# Patient Record
Sex: FEMALE | Race: WHITE | NOT HISPANIC OR LATINO | Employment: UNEMPLOYED | URBAN - NONMETROPOLITAN AREA
[De-identification: names, ages, dates, MRNs, and addresses within clinical notes are randomized per-mention and may not be internally consistent; named-entity substitution may affect disease eponyms.]

---

## 2024-10-21 ENCOUNTER — OFFICE VISIT (OUTPATIENT)
Dept: ORTHOPEDIC SURGERY | Facility: CLINIC | Age: 29
End: 2024-10-21
Payer: COMMERCIAL

## 2024-10-21 VITALS
DIASTOLIC BLOOD PRESSURE: 74 MMHG | HEIGHT: 66 IN | HEART RATE: 79 BPM | WEIGHT: 229.28 LBS | SYSTOLIC BLOOD PRESSURE: 145 MMHG | BODY MASS INDEX: 36.85 KG/M2

## 2024-10-21 DIAGNOSIS — Z01.818 PREOPERATIVE TESTING: ICD-10-CM

## 2024-10-21 DIAGNOSIS — G56.01 CARPAL TUNNEL SYNDROME, RIGHT: Primary | ICD-10-CM

## 2024-10-21 RX ORDER — LORATADINE 10 MG/1
TABLET ORAL
COMMUNITY
Start: 2024-10-10

## 2024-10-21 RX ORDER — ERGOCALCIFEROL 1.25 MG/1
CAPSULE, LIQUID FILLED ORAL
COMMUNITY
Start: 2024-10-10

## 2024-10-21 RX ORDER — GABAPENTIN 800 MG/1
TABLET ORAL
COMMUNITY
Start: 2024-10-10

## 2024-10-21 RX ORDER — ALBUTEROL SULFATE 90 UG/1
AEROSOL, METERED RESPIRATORY (INHALATION)
COMMUNITY
Start: 2024-10-10

## 2024-10-21 RX ORDER — HYDROCHLOROTHIAZIDE 12.5 MG/1
TABLET ORAL
COMMUNITY
Start: 2024-10-10

## 2024-10-21 RX ORDER — FLUOXETINE 10 MG/1
CAPSULE ORAL
COMMUNITY
Start: 2024-10-10

## 2024-10-21 NOTE — PROGRESS NOTES
New Patient Visit      Patient: Pam ZUNIGA  YOB: 1995  Date of Encounter: 10/21/2024        Chief Complaint:   Chief Complaint   Patient presents with    Left Hand - Pain, Numbness, Initial Evaluation    Right Hand - Pain, Numbness, Initial Evaluation           HPI:   Pam ZUNIGA, 29 y.o. female, referred by Tl Arechiga APRN presents        Active Problem List:  Patient Active Problem List   Diagnosis    Pregnancy    Postpartum care following vaginal delivery    Low hemoglobin           Past Medical History:  Past Medical History:   Diagnosis Date    Asthma            Past Surgical History:  Past Surgical History:   Procedure Laterality Date    MOUTH SURGERY             Family History:  Family History   Problem Relation Age of Onset    Alcohol abuse Father     Coronary artery disease Father     Depression Father     Hepatitis Father     Hypertension Father     Hepatitis Mother     Alcohol abuse Paternal Grandfather     Coronary artery disease Paternal Grandfather     Liver disease Paternal Grandfather     Kidney disease Paternal Grandfather     Melanoma Paternal Grandmother     Lupus Paternal Grandmother     Coronary artery disease Paternal Grandmother     Asthma Maternal Grandmother     Coronary artery disease Maternal Grandmother     Diabetes Maternal Grandmother     Kidney disease Maternal Grandmother     Stroke Maternal Grandmother          Social History:  Social History     Socioeconomic History    Marital status: Single   Tobacco Use    Smoking status: Every Day     Types: Cigarettes    Smokeless tobacco: Never   Vaping Use    Vaping status: Some Days   Substance and Sexual Activity    Alcohol use: No    Drug use: No    Sexual activity: Yes     Partners: Male     Birth control/protection: None     Body mass index is 37.02 kg/m².      Medications:  Current Outpatient Medications   Medication Sig Dispense Refill    ferrous sulfate 325 (65 FE) MG tablet Take 1 tablet by mouth 2 (Two)  "Times a Day Before Meals. 60 tablet 1    FLUoxetine (PROzac) 10 MG capsule       gabapentin (NEURONTIN) 800 MG tablet       hydroCHLOROthiazide 12.5 MG tablet       ibuprofen (ADVIL,MOTRIN) 600 MG tablet Take 1 tablet by mouth Every 6 (Six) Hours As Needed for Mild Pain . 40 tablet 1    loratadine (CLARITIN) 10 MG tablet       Ventolin  (90 Base) MCG/ACT inhaler       vitamin D (ERGOCALCIFEROL) 1.25 MG (80167 UT) capsule capsule       docusate sodium (COLACE) 100 MG capsule Take 1 capsule by mouth Daily. 40 capsule 1    Prenatal Vit-Fe Fumarate-FA (PRENATAL, CLASSIC, VITAMIN) 28-0.8 MG tablet tablet Take 1 tablet by mouth Daily.       No current facility-administered medications for this visit.         Allergies:  No Known Allergies      Review of Systems:   Review of Systems      Physical Exam:   Physical Exam  GENERAL: 29 y.o. female, alert and oriented X 3 in no acute distress.   Visit Vitals  /74   Pulse 79   Ht 167.6 cm (65.98\")   Wt 104 kg (229 lb 4.5 oz)   BMI 37.02 kg/m²       GENERAL APPEARANCE: Awake, alert & oriented, in no acute distress and well developed, well nourished.   PSYCH: Normal mood and affect  LUNGS: Breathing nonlabored, no wheezing  EYES: Sclera anicteric, pupils equal  CARDIOVASCULAR: Palpable pulses. Capillary refill less than 2 seconds  INTEGUMENTARY: Skin intact, co clubbing, cyanosis  NEUROLOGIC: Normal Sensation  MUSCULOSKELETAL:  Orthopedic Examination:          Radiology/Labs:     No radiology results for the last 30 days.        Radiographs          Assessment & Plan:   29 y.o. female presents      No diagnosis found.      Procedures      Cc:   Tl Arechiga, APRN                This document has been electronically signed by Jaiden Dumont MD   October 21, 2024 10:07 EDT      "

## 2024-10-21 NOTE — PROGRESS NOTES
History and Physical      Patient: Pam ZUNIGA  YOB: 1995  Date of Encounter: 10/21/2024      Chief Complaint:   Chief Complaint   Patient presents with    Left Hand - Pain, Numbness, Initial Evaluation    Right Hand - Pain, Numbness, Initial Evaluation           HPI:   Pam ZUNIGA, 29 y.o. female, presents for ration of bilateral hand pain numbness right greater than left she has 2-year history of the above complaints she describes numbness 1st through 4th fingers right hand and first and second fingers left hand she describes pain at night which awakens her.  She has worn night braces which have benefited some but her symptoms are progressing she now notices numbness and pain with driving a vehicle or using her phone.  She presents today having nerve conduction studies completed.  Her past medical history includes asthma negative for diabetes or thyroid disease she has 3 young children at home.        Active Problem List:  Patient Active Problem List   Diagnosis    Pregnancy    Postpartum care following vaginal delivery    Low hemoglobin           Past Medical History:  Past Medical History:   Diagnosis Date    Asthma            Past Surgical History:  Past Surgical History:   Procedure Laterality Date    MOUTH SURGERY             Family History:  Family History   Problem Relation Age of Onset    Alcohol abuse Father     Coronary artery disease Father     Depression Father     Hepatitis Father     Hypertension Father     Hepatitis Mother     Alcohol abuse Paternal Grandfather     Coronary artery disease Paternal Grandfather     Liver disease Paternal Grandfather     Kidney disease Paternal Grandfather     Melanoma Paternal Grandmother     Lupus Paternal Grandmother     Coronary artery disease Paternal Grandmother     Asthma Maternal Grandmother     Coronary artery disease Maternal Grandmother     Diabetes Maternal Grandmother     Kidney disease Maternal Grandmother     Stroke Maternal  Grandmother            Social History:  Social History     Socioeconomic History    Marital status: Single   Tobacco Use    Smoking status: Every Day     Types: Cigarettes    Smokeless tobacco: Never   Vaping Use    Vaping status: Some Days   Substance and Sexual Activity    Alcohol use: No    Drug use: No    Sexual activity: Yes     Partners: Male     Birth control/protection: None     Body mass index is 37.02 kg/m².  Class 2 Severe Obesity (BMI >=35 and <=39.9). Obesity-related health conditions include the following:  unknown . Obesity is  unknown . BMI is is above average; no BMI management plan is appropriate. We discussed portion control and increasing exercise.        Medications:  Current Outpatient Medications   Medication Sig Dispense Refill    ferrous sulfate 325 (65 FE) MG tablet Take 1 tablet by mouth 2 (Two) Times a Day Before Meals. 60 tablet 1    FLUoxetine (PROzac) 10 MG capsule       gabapentin (NEURONTIN) 800 MG tablet       hydroCHLOROthiazide 12.5 MG tablet       ibuprofen (ADVIL,MOTRIN) 600 MG tablet Take 1 tablet by mouth Every 6 (Six) Hours As Needed for Mild Pain . 40 tablet 1    loratadine (CLARITIN) 10 MG tablet       Ventolin  (90 Base) MCG/ACT inhaler       vitamin D (ERGOCALCIFEROL) 1.25 MG (85233 UT) capsule capsule       docusate sodium (COLACE) 100 MG capsule Take 1 capsule by mouth Daily. 40 capsule 1    Prenatal Vit-Fe Fumarate-FA (PRENATAL, CLASSIC, VITAMIN) 28-0.8 MG tablet tablet Take 1 tablet by mouth Daily.       No current facility-administered medications for this visit.           Allergies:  No Known Allergies        Review of Systems:   Review of Systems   Constitutional: Negative.  Negative for chills, fatigue and fever.   HENT: Negative.  Negative for congestion, ear pain, facial swelling, sore throat, trouble swallowing and voice change.    Eyes:  Negative for pain, discharge, redness and visual disturbance.   Respiratory: Negative.  Negative for apnea, cough,  "choking, chest tightness, shortness of breath, wheezing and stridor.    Cardiovascular: Negative.  Negative for chest pain, palpitations and leg swelling.   Gastrointestinal: Negative.  Negative for abdominal distention, abdominal pain, blood in stool, nausea and vomiting.   Endocrine: Negative.  Negative for cold intolerance, heat intolerance, polydipsia and polyphagia.   Genitourinary: Negative.  Negative for difficulty urinating, dysuria, flank pain, frequency and hematuria.   Musculoskeletal:  Positive for arthralgias.   Skin: Negative.  Negative for color change, pallor, rash and wound.   Allergic/Immunologic: Negative.  Negative for environmental allergies, food allergies and immunocompromised state.   Neurological: Negative.  Negative for dizziness, tremors, seizures, syncope, speech difficulty, weakness, light-headedness, numbness and headaches.   Hematological: Negative.  Negative for adenopathy. Does not bruise/bleed easily.   Psychiatric/Behavioral: Negative.  Negative for behavioral problems, confusion, dysphoric mood, self-injury, sleep disturbance and suicidal ideas. The patient is not nervous/anxious.            Physical Exam:   Physical Exam  GENERAL: 29 y.o. female, alert and oriented X 3 in no acute distress.   Visit Vitals  /74   Pulse 79   Ht 167.6 cm (65.98\")   Wt 104 kg (229 lb 4.5 oz)   Breastfeeding No   BMI 37.02 kg/m²         Musculoskeletal Examination:   Bilateral hands reveals normal thenar tone.  She has diminished sensation greater on the right than the left with involvement of thumb through fourth fingers.  She demonstrates moderately positive Phalen sign right greater than left vascular examination is intact.        Assessment & Plan:   29 y.o. female presents with 2-year history bilateral hand pain right greater than left with clinical findings of carpal tunnel syndrome supported by nerve conduction studies which describe moderate to severe carpal tunnel syndrome right.  " Discussed options she wishes to proceed with proposed carpal tunnel release right hand she is scheduled [facility] November 7, 2024 she will hold her ibuprofen preoperatively.      ICD-10-CM ICD-9-CM   1. Carpal tunnel syndrome, right  G56.01 354.0   2. Preoperative testing  Z01.818 V72.84           Cc:   Tl Arechiga, APRN              This document has been electronically signed by Jaiden Dumont MD   October 21, 2024 12:00 EDT

## 2024-10-21 NOTE — H&P (VIEW-ONLY)
History and Physical      Patient: Pam ZUNIGA  YOB: 1995  Date of Encounter: 10/21/2024      Chief Complaint:   Chief Complaint   Patient presents with    Left Hand - Pain, Numbness, Initial Evaluation    Right Hand - Pain, Numbness, Initial Evaluation           HPI:   Pam ZUNIGA, 29 y.o. female, presents for ration of bilateral hand pain numbness right greater than left she has 2-year history of the above complaints she describes numbness 1st through 4th fingers right hand and first and second fingers left hand she describes pain at night which awakens her.  She has worn night braces which have benefited some but her symptoms are progressing she now notices numbness and pain with driving a vehicle or using her phone.  She presents today having nerve conduction studies completed.  Her past medical history includes asthma negative for diabetes or thyroid disease she has 3 young children at home.        Active Problem List:  Patient Active Problem List   Diagnosis    Pregnancy    Postpartum care following vaginal delivery    Low hemoglobin           Past Medical History:  Past Medical History:   Diagnosis Date    Asthma            Past Surgical History:  Past Surgical History:   Procedure Laterality Date    MOUTH SURGERY             Family History:  Family History   Problem Relation Age of Onset    Alcohol abuse Father     Coronary artery disease Father     Depression Father     Hepatitis Father     Hypertension Father     Hepatitis Mother     Alcohol abuse Paternal Grandfather     Coronary artery disease Paternal Grandfather     Liver disease Paternal Grandfather     Kidney disease Paternal Grandfather     Melanoma Paternal Grandmother     Lupus Paternal Grandmother     Coronary artery disease Paternal Grandmother     Asthma Maternal Grandmother     Coronary artery disease Maternal Grandmother     Diabetes Maternal Grandmother     Kidney disease Maternal Grandmother     Stroke Maternal  Grandmother            Social History:  Social History     Socioeconomic History    Marital status: Single   Tobacco Use    Smoking status: Every Day     Types: Cigarettes    Smokeless tobacco: Never   Vaping Use    Vaping status: Some Days   Substance and Sexual Activity    Alcohol use: No    Drug use: No    Sexual activity: Yes     Partners: Male     Birth control/protection: None     Body mass index is 37.02 kg/m².  Class 2 Severe Obesity (BMI >=35 and <=39.9). Obesity-related health conditions include the following:  unknown . Obesity is  unknown . BMI is is above average; no BMI management plan is appropriate. We discussed portion control and increasing exercise.        Medications:  Current Outpatient Medications   Medication Sig Dispense Refill    ferrous sulfate 325 (65 FE) MG tablet Take 1 tablet by mouth 2 (Two) Times a Day Before Meals. 60 tablet 1    FLUoxetine (PROzac) 10 MG capsule       gabapentin (NEURONTIN) 800 MG tablet       hydroCHLOROthiazide 12.5 MG tablet       ibuprofen (ADVIL,MOTRIN) 600 MG tablet Take 1 tablet by mouth Every 6 (Six) Hours As Needed for Mild Pain . 40 tablet 1    loratadine (CLARITIN) 10 MG tablet       Ventolin  (90 Base) MCG/ACT inhaler       vitamin D (ERGOCALCIFEROL) 1.25 MG (36760 UT) capsule capsule       docusate sodium (COLACE) 100 MG capsule Take 1 capsule by mouth Daily. 40 capsule 1    Prenatal Vit-Fe Fumarate-FA (PRENATAL, CLASSIC, VITAMIN) 28-0.8 MG tablet tablet Take 1 tablet by mouth Daily.       No current facility-administered medications for this visit.           Allergies:  No Known Allergies        Review of Systems:   Review of Systems   Constitutional: Negative.  Negative for chills, fatigue and fever.   HENT: Negative.  Negative for congestion, ear pain, facial swelling, sore throat, trouble swallowing and voice change.    Eyes:  Negative for pain, discharge, redness and visual disturbance.   Respiratory: Negative.  Negative for apnea, cough,  "choking, chest tightness, shortness of breath, wheezing and stridor.    Cardiovascular: Negative.  Negative for chest pain, palpitations and leg swelling.   Gastrointestinal: Negative.  Negative for abdominal distention, abdominal pain, blood in stool, nausea and vomiting.   Endocrine: Negative.  Negative for cold intolerance, heat intolerance, polydipsia and polyphagia.   Genitourinary: Negative.  Negative for difficulty urinating, dysuria, flank pain, frequency and hematuria.   Musculoskeletal:  Positive for arthralgias.   Skin: Negative.  Negative for color change, pallor, rash and wound.   Allergic/Immunologic: Negative.  Negative for environmental allergies, food allergies and immunocompromised state.   Neurological: Negative.  Negative for dizziness, tremors, seizures, syncope, speech difficulty, weakness, light-headedness, numbness and headaches.   Hematological: Negative.  Negative for adenopathy. Does not bruise/bleed easily.   Psychiatric/Behavioral: Negative.  Negative for behavioral problems, confusion, dysphoric mood, self-injury, sleep disturbance and suicidal ideas. The patient is not nervous/anxious.            Physical Exam:   Physical Exam  GENERAL: 29 y.o. female, alert and oriented X 3 in no acute distress.   Visit Vitals  /74   Pulse 79   Ht 167.6 cm (65.98\")   Wt 104 kg (229 lb 4.5 oz)   Breastfeeding No   BMI 37.02 kg/m²         Musculoskeletal Examination:   Bilateral hands reveals normal thenar tone.  She has diminished sensation greater on the right than the left with involvement of thumb through fourth fingers.  She demonstrates moderately positive Phalen sign right greater than left vascular examination is intact.        Assessment & Plan:   29 y.o. female presents with 2-year history bilateral hand pain right greater than left with clinical findings of carpal tunnel syndrome supported by nerve conduction studies which describe moderate to severe carpal tunnel syndrome right.  " Discussed options she wishes to proceed with proposed carpal tunnel release right hand she is scheduled [facility] November 7, 2024 she will hold her ibuprofen preoperatively.      ICD-10-CM ICD-9-CM   1. Carpal tunnel syndrome, right  G56.01 354.0   2. Preoperative testing  Z01.818 V72.84           Cc:   Tl Arechiga, APRN              This document has been electronically signed by Jaiden Dumont MD   October 21, 2024 12:00 EDT

## 2024-11-04 NOTE — DISCHARGE INSTRUCTIONS
11/07/24  ARRIVAL TIME PER DR OFFICE    TAKE the following medications the morning of surgery:  All heart or blood pressure medications    HOLD all diabetic medications the morning of surgery as ordered by physician.    Please discontinue all blood thinners and anticoagulants (except aspirin) prior to surgery as per your surgeon and cardiologist instructions.  Aspirin may be continued up to the day prior to surgery.     CHLORHEXIDINE CLOTHS GIVEN WITH INSTRUCTIONS AND FORM TO RETURN TO HOSPITAL, IF APPLICABLE.    General Instructions:  Do not eat or drink after midnight: includes water, mints, or gum. You may brush your teeth.  Dental appliances that are removable must be taken out day of surgery.  Do not smoke, chew tobacco, or drink alcohol.  Bring medications in original bottles, any inhalers and if applicable your C-PAP/BI-PAP machine.  Bring any papers given to you in the doctor's office.  Wear clean comfortable clothes and socks.  Do not wear contact lenses or make-up. Bring a case for your glasses if applicable.  Bring crutches or walker if applicable.  Leave all other valuables and jewelry at home.    If you were given a blood bank ID arm band remember to bring it with you the day of surgery.    Preventing a Surgical Site Infection:  Shower the night before surgery (unless instructed other wise) using a fresh bar of anti-bacterial soap (such as Dial) and clean washcloth. Dry with a clean towel and dress in clean clothing.  For 2 to 3 days before surgery, avoid shaving with a razor near where you will have surgery because the razor can irritate skin and make it easier to develop an infection. Ask your surgeon if you will be receiving antibiotics prior to surgery.  Make sure you, your family, and all healthcare providers clear their hands with soap and water or an alcohol-based hand  before caring for you or your wound.  If at all possible, quit smoking as many days before surgery as you can.    Day  of surgery:  Upon arrival, a Pre-op nurse and Anesthesiologist will review your health history, obtain vital signs, and answer questions you may have. The only belongings needed at this time will be your home medications and if applicable your C-PAP/BI-PAP machine. If you are staying overnight your family can leave the rest of your belongings in the car and bring them to your room later. A Pre-op nurse will start an IV and you may receive medication in preparation for surgery, including something to help you relax. Your family will be able to see you in the Pre-op area. While you are in surgery your family should notify the waiting room  if they leave the waiting room area and provide a contact phone number.    Please be aware that surgery does come with discomfort. We want to make every effort to control your discomfort so please discuss any uncontrolled symptoms with your nurse. Your doctor will most likely have prescribed pain medications.    If you are going home after surgery you will receive individualized written care instructions before being discharged. A responsible adult must drive you to and from the hospital on the day of surgery and stay with you for 24 hours.    If you are staying overnight following surgery, you will be transported to your hospital room following the recovery period.     If you have any questions please call Pre-Admission Testing at [phone] ext [phone] Monday-Friday 8:00-3:00  Deductibles and co-payments are collected on the day of service. Please be prepared to pay the required co-pay, deductible or deposit on the day of service as defined by your plan.    A RESPONSIBLE PERSON MUST REMAIN IN THE WAITING ROOM DURING YOUR PROCEDURE AND A RESPONSIBLE  MUST BE AVAILABLE UPON YOUR DISCHARGE.

## 2024-11-05 ENCOUNTER — PRE-ADMISSION TESTING (OUTPATIENT)
Dept: PREADMISSION TESTING | Facility: HOSPITAL | Age: 29
End: 2024-11-05
Payer: COMMERCIAL

## 2024-11-05 DIAGNOSIS — Z01.818 PREOPERATIVE TESTING: ICD-10-CM

## 2024-11-05 DIAGNOSIS — G56.01 CARPAL TUNNEL SYNDROME, RIGHT: ICD-10-CM

## 2024-11-05 LAB
ANION GAP SERPL CALCULATED.3IONS-SCNC: 10.8 MMOL/L (ref 5–15)
BUN SERPL-MCNC: 13 MG/DL (ref 6–20)
BUN/CREAT SERPL: 14.4 (ref 7–25)
CALCIUM SPEC-SCNC: 9.3 MG/DL (ref 8.6–10.5)
CHLORIDE SERPL-SCNC: 99 MMOL/L (ref 98–107)
CO2 SERPL-SCNC: 25.2 MMOL/L (ref 22–29)
CREAT SERPL-MCNC: 0.9 MG/DL (ref 0.57–1)
DEPRECATED RDW RBC AUTO: 41.3 FL (ref 37–54)
EGFRCR SERPLBLD CKD-EPI 2021: 89.5 ML/MIN/1.73
ERYTHROCYTE [DISTWIDTH] IN BLOOD BY AUTOMATED COUNT: 13.2 % (ref 12.3–15.4)
GLUCOSE SERPL-MCNC: 112 MG/DL (ref 65–99)
HCT VFR BLD AUTO: 33.9 % (ref 34–46.6)
HGB BLD-MCNC: 10.5 G/DL (ref 12–15.9)
MCH RBC QN AUTO: 26.6 PG (ref 26.6–33)
MCHC RBC AUTO-ENTMCNC: 31 G/DL (ref 31.5–35.7)
MCV RBC AUTO: 86 FL (ref 79–97)
PLATELET # BLD AUTO: 254 10*3/MM3 (ref 140–450)
PMV BLD AUTO: 11 FL (ref 6–12)
POTASSIUM SERPL-SCNC: 3.4 MMOL/L (ref 3.5–5.2)
RBC # BLD AUTO: 3.94 10*6/MM3 (ref 3.77–5.28)
SODIUM SERPL-SCNC: 135 MMOL/L (ref 136–145)
WBC NRBC COR # BLD AUTO: 8.17 10*3/MM3 (ref 3.4–10.8)

## 2024-11-05 PROCEDURE — 85027 COMPLETE CBC AUTOMATED: CPT

## 2024-11-05 PROCEDURE — 36415 COLL VENOUS BLD VENIPUNCTURE: CPT

## 2024-11-05 PROCEDURE — 80048 BASIC METABOLIC PNL TOTAL CA: CPT

## 2024-11-05 RX ORDER — LEVOTHYROXINE SODIUM 100 UG/1
100 TABLET ORAL DAILY
COMMUNITY

## 2024-11-07 ENCOUNTER — HOSPITAL ENCOUNTER (OUTPATIENT)
Facility: HOSPITAL | Age: 29
Setting detail: HOSPITAL OUTPATIENT SURGERY
Discharge: HOME | End: 2024-11-07
Attending: ORTHOPAEDIC SURGERY | Admitting: ORTHOPAEDIC SURGERY
Payer: COMMERCIAL

## 2024-11-07 ENCOUNTER — ANESTHESIA EVENT (OUTPATIENT)
Dept: PERIOP | Facility: HOSPITAL | Age: 29
End: 2024-11-07
Payer: COMMERCIAL

## 2024-11-07 ENCOUNTER — ANESTHESIA (OUTPATIENT)
Dept: PERIOP | Facility: HOSPITAL | Age: 29
End: 2024-11-07
Payer: COMMERCIAL

## 2024-11-07 VITALS
HEIGHT: 66 IN | OXYGEN SATURATION: 100 % | HEART RATE: 52 BPM | SYSTOLIC BLOOD PRESSURE: 101 MMHG | TEMPERATURE: 97.4 F | WEIGHT: 293 LBS | RESPIRATION RATE: 18 BRPM | BODY MASS INDEX: 47.09 KG/M2 | DIASTOLIC BLOOD PRESSURE: 62 MMHG

## 2024-11-07 DIAGNOSIS — Z01.818 PREOPERATIVE TESTING: ICD-10-CM

## 2024-11-07 DIAGNOSIS — G56.01 CARPAL TUNNEL SYNDROME, RIGHT: ICD-10-CM

## 2024-11-07 LAB
B-HCG UR QL: NEGATIVE
EXPIRATION DATE: NORMAL
INTERNAL NEGATIVE CONTROL: NEGATIVE
INTERNAL POSITIVE CONTROL: POSITIVE
Lab: NORMAL

## 2024-11-07 PROCEDURE — 64721 CARPAL TUNNEL SURGERY: CPT | Performed by: ORTHOPAEDIC SURGERY

## 2024-11-07 PROCEDURE — 25010000002 LIDOCAINE PF 2% 2 % SOLUTION: Performed by: NURSE ANESTHETIST, CERTIFIED REGISTERED

## 2024-11-07 PROCEDURE — 25010000002 LIDOCAINE 1 % SOLUTION 2 ML VIAL: Performed by: ORTHOPAEDIC SURGERY

## 2024-11-07 PROCEDURE — 25010000002 FENTANYL CITRATE (PF) 50 MCG/ML SOLUTION: Performed by: NURSE ANESTHETIST, CERTIFIED REGISTERED

## 2024-11-07 PROCEDURE — 25010000002 KETOROLAC TROMETHAMINE PER 15 MG: Performed by: NURSE ANESTHETIST, CERTIFIED REGISTERED

## 2024-11-07 PROCEDURE — 81025 URINE PREGNANCY TEST: CPT | Performed by: ANESTHESIOLOGY

## 2024-11-07 PROCEDURE — 25010000002 LIDOCAINE 1 % SOLUTION: Performed by: ORTHOPAEDIC SURGERY

## 2024-11-07 PROCEDURE — 25010000002 MIDAZOLAM PER 1 MG: Performed by: NURSE ANESTHETIST, CERTIFIED REGISTERED

## 2024-11-07 PROCEDURE — 25010000002 BUPIVACAINE (PF) 0.5 % SOLUTION 10 ML VIAL: Performed by: ORTHOPAEDIC SURGERY

## 2024-11-07 PROCEDURE — 25010000002 PROPOFOL 200 MG/20ML EMULSION: Performed by: NURSE ANESTHETIST, CERTIFIED REGISTERED

## 2024-11-07 PROCEDURE — 25010000002 ONDANSETRON PER 1 MG: Performed by: NURSE ANESTHETIST, CERTIFIED REGISTERED

## 2024-11-07 RX ORDER — PROPOFOL 10 MG/ML
INJECTION, EMULSION INTRAVENOUS AS NEEDED
Status: DISCONTINUED | OUTPATIENT
Start: 2024-11-07 | End: 2024-11-07 | Stop reason: SURG

## 2024-11-07 RX ORDER — SODIUM CHLORIDE 0.9 % (FLUSH) 0.9 %
10 SYRINGE (ML) INJECTION EVERY 12 HOURS SCHEDULED
Status: DISCONTINUED | OUTPATIENT
Start: 2024-11-07 | End: 2024-11-07 | Stop reason: HOSPADM

## 2024-11-07 RX ORDER — ONDANSETRON 2 MG/ML
INJECTION INTRAMUSCULAR; INTRAVENOUS AS NEEDED
Status: DISCONTINUED | OUTPATIENT
Start: 2024-11-07 | End: 2024-11-07 | Stop reason: SURG

## 2024-11-07 RX ORDER — SODIUM CHLORIDE 0.9 % (FLUSH) 0.9 %
10 SYRINGE (ML) INJECTION AS NEEDED
Status: DISCONTINUED | OUTPATIENT
Start: 2024-11-07 | End: 2024-11-07 | Stop reason: HOSPADM

## 2024-11-07 RX ORDER — SODIUM CHLORIDE, SODIUM LACTATE, POTASSIUM CHLORIDE, CALCIUM CHLORIDE 600; 310; 30; 20 MG/100ML; MG/100ML; MG/100ML; MG/100ML
125 INJECTION, SOLUTION INTRAVENOUS ONCE
Status: DISCONTINUED | OUTPATIENT
Start: 2024-11-07 | End: 2024-11-07 | Stop reason: HOSPADM

## 2024-11-07 RX ORDER — SODIUM CHLORIDE 9 MG/ML
40 INJECTION, SOLUTION INTRAVENOUS AS NEEDED
Status: DISCONTINUED | OUTPATIENT
Start: 2024-11-07 | End: 2024-11-07 | Stop reason: HOSPADM

## 2024-11-07 RX ORDER — MEPERIDINE HYDROCHLORIDE 25 MG/ML
12.5 INJECTION INTRAMUSCULAR; INTRAVENOUS; SUBCUTANEOUS
Status: DISCONTINUED | OUTPATIENT
Start: 2024-11-07 | End: 2024-11-07 | Stop reason: HOSPADM

## 2024-11-07 RX ORDER — IPRATROPIUM BROMIDE AND ALBUTEROL SULFATE 2.5; .5 MG/3ML; MG/3ML
3 SOLUTION RESPIRATORY (INHALATION) ONCE AS NEEDED
Status: DISCONTINUED | OUTPATIENT
Start: 2024-11-07 | End: 2024-11-07 | Stop reason: HOSPADM

## 2024-11-07 RX ORDER — MAGNESIUM HYDROXIDE 1200 MG/15ML
LIQUID ORAL AS NEEDED
Status: DISCONTINUED | OUTPATIENT
Start: 2024-11-07 | End: 2024-11-07 | Stop reason: HOSPADM

## 2024-11-07 RX ORDER — KETOROLAC TROMETHAMINE 30 MG/ML
30 INJECTION, SOLUTION INTRAMUSCULAR; INTRAVENOUS EVERY 6 HOURS PRN
Status: COMPLETED | OUTPATIENT
Start: 2024-11-07 | End: 2024-11-07

## 2024-11-07 RX ORDER — LIDOCAINE HYDROCHLORIDE 20 MG/ML
INJECTION, SOLUTION EPIDURAL; INFILTRATION; INTRACAUDAL; PERINEURAL AS NEEDED
Status: DISCONTINUED | OUTPATIENT
Start: 2024-11-07 | End: 2024-11-07 | Stop reason: SURG

## 2024-11-07 RX ORDER — MIDAZOLAM HYDROCHLORIDE 1 MG/ML
1 INJECTION, SOLUTION INTRAMUSCULAR; INTRAVENOUS
Status: DISCONTINUED | OUTPATIENT
Start: 2024-11-07 | End: 2024-11-07 | Stop reason: HOSPADM

## 2024-11-07 RX ORDER — ONDANSETRON 2 MG/ML
4 INJECTION INTRAMUSCULAR; INTRAVENOUS AS NEEDED
Status: DISCONTINUED | OUTPATIENT
Start: 2024-11-07 | End: 2024-11-07 | Stop reason: HOSPADM

## 2024-11-07 RX ORDER — OXYCODONE AND ACETAMINOPHEN 5; 325 MG/1; MG/1
1 TABLET ORAL ONCE AS NEEDED
Status: DISCONTINUED | OUTPATIENT
Start: 2024-11-07 | End: 2024-11-07 | Stop reason: HOSPADM

## 2024-11-07 RX ORDER — FAMOTIDINE 10 MG/ML
INJECTION, SOLUTION INTRAVENOUS AS NEEDED
Status: DISCONTINUED | OUTPATIENT
Start: 2024-11-07 | End: 2024-11-07 | Stop reason: SURG

## 2024-11-07 RX ORDER — MIDAZOLAM HYDROCHLORIDE 1 MG/ML
INJECTION, SOLUTION INTRAMUSCULAR; INTRAVENOUS AS NEEDED
Status: DISCONTINUED | OUTPATIENT
Start: 2024-11-07 | End: 2024-11-07 | Stop reason: SURG

## 2024-11-07 RX ORDER — FENTANYL CITRATE 50 UG/ML
INJECTION, SOLUTION INTRAMUSCULAR; INTRAVENOUS AS NEEDED
Status: DISCONTINUED | OUTPATIENT
Start: 2024-11-07 | End: 2024-11-07 | Stop reason: SURG

## 2024-11-07 RX ORDER — FENTANYL CITRATE 50 UG/ML
50 INJECTION, SOLUTION INTRAMUSCULAR; INTRAVENOUS
Status: DISCONTINUED | OUTPATIENT
Start: 2024-11-07 | End: 2024-11-07 | Stop reason: HOSPADM

## 2024-11-07 RX ORDER — LIDOCAINE HYDROCHLORIDE 10 MG/ML
INJECTION, SOLUTION INFILTRATION; PERINEURAL AS NEEDED
Status: DISCONTINUED | OUTPATIENT
Start: 2024-11-07 | End: 2024-11-07 | Stop reason: HOSPADM

## 2024-11-07 RX ORDER — OXYCODONE AND ACETAMINOPHEN 5; 325 MG/1; MG/1
1 TABLET ORAL EVERY 4 HOURS PRN
Qty: 10 TABLET | Refills: 0 | Status: SHIPPED | OUTPATIENT
Start: 2024-11-07

## 2024-11-07 RX ADMIN — LIDOCAINE HYDROCHLORIDE 60 MG: 20 INJECTION, SOLUTION EPIDURAL; INFILTRATION; INTRACAUDAL; PERINEURAL at 08:36

## 2024-11-07 RX ADMIN — FENTANYL CITRATE 50 MCG: 50 INJECTION INTRAMUSCULAR; INTRAVENOUS at 08:44

## 2024-11-07 RX ADMIN — PROPOFOL 50 MG: 10 INJECTION, EMULSION INTRAVENOUS at 08:51

## 2024-11-07 RX ADMIN — PROPOFOL 50 MG: 10 INJECTION, EMULSION INTRAVENOUS at 09:04

## 2024-11-07 RX ADMIN — PROPOFOL 50 MG: 10 INJECTION, EMULSION INTRAVENOUS at 09:07

## 2024-11-07 RX ADMIN — FENTANYL CITRATE 50 MCG: 50 INJECTION INTRAMUSCULAR; INTRAVENOUS at 08:36

## 2024-11-07 RX ADMIN — PROPOFOL 50 MG: 10 INJECTION, EMULSION INTRAVENOUS at 08:44

## 2024-11-07 RX ADMIN — KETOROLAC TROMETHAMINE 30 MG: 30 INJECTION, SOLUTION INTRAMUSCULAR; INTRAVENOUS at 09:58

## 2024-11-07 RX ADMIN — PROPOFOL 50 MG: 10 INJECTION, EMULSION INTRAVENOUS at 08:54

## 2024-11-07 RX ADMIN — FAMOTIDINE 20 MG: 10 INJECTION, SOLUTION INTRAVENOUS at 08:32

## 2024-11-07 RX ADMIN — PROPOFOL 100 MG: 10 INJECTION, EMULSION INTRAVENOUS at 08:41

## 2024-11-07 RX ADMIN — ONDANSETRON 4 MG: 2 INJECTION INTRAMUSCULAR; INTRAVENOUS at 08:36

## 2024-11-07 RX ADMIN — PROPOFOL 50 MG: 10 INJECTION, EMULSION INTRAVENOUS at 09:00

## 2024-11-07 RX ADMIN — PROPOFOL 50 MG: 10 INJECTION, EMULSION INTRAVENOUS at 08:48

## 2024-11-07 RX ADMIN — PROPOFOL 50 MG: 10 INJECTION, EMULSION INTRAVENOUS at 08:56

## 2024-11-07 RX ADMIN — MIDAZOLAM HYDROCHLORIDE 2 MG: 1 INJECTION, SOLUTION INTRAMUSCULAR; INTRAVENOUS at 08:32

## 2024-11-07 NOTE — OP NOTE
CARPAL TUNNEL RELEASE  Procedure Note    Pam ZUNIGA  11/7/2024    Pre-op Diagnosis:   Carpal tunnel syndrome, right [G56.01]    Post-op Diagnosis:     Post-Op Diagnosis Codes:     * Carpal tunnel syndrome, right [G56.01]           Procedure(s):  CARPAL TUNNEL RELEASE RIGHT    Surgeon(s):  Jaiden Dumont MD    Anesthesia: General-local    Operative technique: With patient in the operating theatre under neuro anesthesia with right hand and arm sterilely prepped and draped usual manner palmar aspect right hand infiltrated with 5 cc of 0.5 Marcaine.  Extremity was exsanguinated tourniquet inflated to 200 mmHg longitudinal incision then made beginning at the distal wrist crease directed toward third webspace skin divided sharply underlying palmar fascia divided longitudinally exposing transverse carpal ligament which was divided from distal to proximal protecting underlying median nerve.  It was deflated hemostasis obtained with electrocautery the wound closed in a single layer 3-0 nylon vertical mattress suture with sterile dressing applied she was taken to recovery room in stable condition.    Staff:   Circulator: Lusi Wharton RN  Scrub Person: Jj Matthews  Assistant: Aliyah Abad    Estimated Blood Loss: none    Specimens:   none               Implants/Grafts: none      Drains: None    Complications: none    Tourniquet time: 7   min                      Jaiden Dumont MD     Date: 11/7/2024  Time: 09:04 EST    Cc: Tl Arechiga, APRN

## 2024-11-07 NOTE — ANESTHESIA PREPROCEDURE EVALUATION
Anesthesia Evaluation     Patient summary reviewed and Nursing notes reviewed   NPO Solid Status: > 6 hours  NPO Liquid Status: > 6 hours           Airway   Mallampati: I  TM distance: <3 FB  Neck ROM: full  No difficulty expected  Dental - normal exam     Pulmonary - normal exam    breath sounds clear to auscultation  (+) asthma,sleep apnea  Cardiovascular - negative cardio ROS and normal exam  Exercise tolerance: excellent (>7 METS)    NYHA Classification: I  ECG reviewed  Rhythm: regular  Rate: normal        Neuro/Psych  (+) numbness, psychiatric history Anxiety and Depression  GI/Hepatic/Renal/Endo    (+) morbid obesity, GERD, thyroid problem     Musculoskeletal     (+) back pain  Abdominal  - normal exam   Substance History - negative use     OB/GYN          Other - negative ROS                     Anesthesia Plan    ASA 3     MAC     intravenous induction     Anesthetic plan, risks, benefits, and alternatives have been provided, discussed and informed consent has been obtained with: patient.    Use of blood products discussed with  Consented to blood products.

## 2024-11-07 NOTE — ANESTHESIA POSTPROCEDURE EVALUATION
Patient: Pam ZUNIGA    Procedure Summary       Date: 11/07/24 Room / Location: [facility] OR  /  [facility] OR    Anesthesia Start: 0832 Anesthesia Stop: 0915    Procedure: CARPAL TUNNEL RELEASE RIGHT (Right: Wrist) Diagnosis:       Carpal tunnel syndrome, right      (Carpal tunnel syndrome, right [G56.01])    Surgeons: Jaiden Dumont MD Provider: Miguel Treadwell MD    Anesthesia Type: MAC ASA Status: 3            Anesthesia Type: MAC    Vitals  Vitals Value Taken Time   /85 11/07/24 0947   Temp 97.6 °F (36.4 °C) 11/07/24 0917   Pulse 50 11/07/24 0949   Resp 19 11/07/24 0947   SpO2 100 % 11/07/24 0949   Vitals shown include unfiled device data.        Post Anesthesia Care and Evaluation    Patient location during evaluation: PACU  Patient participation: complete - patient participated  Level of consciousness: awake  Pain score: 3  Pain management: adequate    Airway patency: patent  Anesthetic complications: No anesthetic complications  PONV Status: controlled  Cardiovascular status: acceptable and blood pressure returned to baseline  Respiratory status: acceptable and room air  Hydration status: acceptable    Comments: Patient comfortable with discharge at this time.

## 2024-11-20 ENCOUNTER — OFFICE VISIT (OUTPATIENT)
Dept: ORTHOPEDIC SURGERY | Facility: CLINIC | Age: 29
End: 2024-11-20
Payer: COMMERCIAL

## 2024-11-20 VITALS — HEIGHT: 66 IN | WEIGHT: 293 LBS | BODY MASS INDEX: 47.09 KG/M2

## 2024-11-20 DIAGNOSIS — Z09 POSTOP CHECK: ICD-10-CM

## 2024-11-20 DIAGNOSIS — G56.01 CARPAL TUNNEL SYNDROME, RIGHT: Primary | ICD-10-CM

## 2024-11-20 PROCEDURE — 99024 POSTOP FOLLOW-UP VISIT: CPT | Performed by: ORTHOPAEDIC SURGERY

## 2024-11-20 NOTE — PROGRESS NOTES
Postoperative Follow-up          Patient: Pam ZUNIGA  YOB: 1995  Date of Encounter: 11/20/2024      Chief Complaint:   Chief Complaint   Patient presents with    Right Hand - Post-op, Follow-up            HPI:  Pam ZUNIGA, 29 y.o. female returns in postoperative follow-up tunnel release right hand performed November 7, 2024 she presents today doing well with improvement in her hand sensation minimal pain in her palm.        Medical History:  Patient Active Problem List   Diagnosis    Pregnancy    Postpartum care following vaginal delivery    Low hemoglobin     Past Medical History:   Diagnosis Date    Anxiety and depression     Asthma     Carpal tunnel syndrome     Disease of thyroid gland     Eczema            Surgical History:  Past Surgical History:   Procedure Laterality Date    CARPAL TUNNEL RELEASE Right 11/7/2024    Procedure: CARPAL TUNNEL RELEASE RIGHT;  Surgeon: Jaiden Dumont MD;  Location: [facility];  Service: Orthopedics;  Laterality: Right;    MOUTH SURGERY           Examination:  Examination right hand reveals palmar incision intact without surrounding erythema she has active motion of all fingers and near normal sensation.        Assessment & Plan:  29 y.o. female presents 13 days following carpal tunnel release right hand doing well sutures are removed return in 6 weeks with any residual problems.       Diagnosis Plan   1. Carpal tunnel syndrome, right        2. Postop check              Cc:  Tl Arechiga, APRN              This document has been electronically signed by Jaiden Dumont MD   November 22, 2024 10:52 EST

## (undated) DEVICE — BNDG ELAS MATRX V/CLS 2INX5YD LF

## (undated) DEVICE — DRSNG WND GZ CURAD OIL EMULSION 3X3IN STRL

## (undated) DEVICE — CUFF TOURNI 1BLADDER 1PRT 24IN STRL

## (undated) DEVICE — GLV SURG TRIUMPH MICRO PF LTX 8 STRL

## (undated) DEVICE — PK EXTREM UPPR 70

## (undated) DEVICE — PATIENT RETURN ELECTRODE, SINGLE-USE, CONTACT QUALITY MONITORING, ADULT, WITH 9FT CORD, FOR PATIENTS WEIGING OVER 33LBS. (15KG): Brand: MEGADYNE

## (undated) DEVICE — SUT ETHLN 3/0 FS1 663G

## (undated) DEVICE — BANDAGE,ELASTIC,ESMARK,STERILE,4"X9',LF: Brand: MEDLINE

## (undated) DEVICE — HOLDER: Brand: DEROYAL